# Patient Record
Sex: MALE | Race: WHITE | NOT HISPANIC OR LATINO | URBAN - METROPOLITAN AREA
[De-identification: names, ages, dates, MRNs, and addresses within clinical notes are randomized per-mention and may not be internally consistent; named-entity substitution may affect disease eponyms.]

---

## 2023-06-04 ENCOUNTER — EMERGENCY (EMERGENCY)
Facility: HOSPITAL | Age: 28
LOS: 1 days | Discharge: HOME | End: 2023-06-04
Attending: INTERNAL MEDICINE | Admitting: INTERNAL MEDICINE
Payer: COMMERCIAL

## 2023-06-04 VITALS
OXYGEN SATURATION: 99 % | HEART RATE: 90 BPM | WEIGHT: 164.91 LBS | RESPIRATION RATE: 18 BRPM | TEMPERATURE: 99 F | DIASTOLIC BLOOD PRESSURE: 75 MMHG | HEIGHT: 71 IN | SYSTOLIC BLOOD PRESSURE: 117 MMHG

## 2023-06-04 LAB
ALBUMIN SERPL ELPH-MCNC: 4.2 G/DL — SIGNIFICANT CHANGE UP (ref 3.3–5)
ALP SERPL-CCNC: 356 U/L — HIGH (ref 40–120)
ALT FLD-CCNC: 32 U/L — SIGNIFICANT CHANGE UP (ref 12–78)
AMPHET UR-MCNC: NEGATIVE — SIGNIFICANT CHANGE UP
AMYLASE P1 CFR SERPL: 64 U/L — SIGNIFICANT CHANGE UP (ref 25–125)
ANION GAP SERPL CALC-SCNC: 3 MMOL/L — LOW (ref 5–17)
APPEARANCE UR: CLEAR — SIGNIFICANT CHANGE UP
AST SERPL-CCNC: 24 U/L — SIGNIFICANT CHANGE UP (ref 15–37)
BARBITURATES UR SCN-MCNC: NEGATIVE — SIGNIFICANT CHANGE UP
BASOPHILS # BLD AUTO: 0.04 K/UL — SIGNIFICANT CHANGE UP (ref 0–0.2)
BASOPHILS NFR BLD AUTO: 0.4 % — SIGNIFICANT CHANGE UP (ref 0–2)
BENZODIAZ UR-MCNC: NEGATIVE — SIGNIFICANT CHANGE UP
BILIRUB SERPL-MCNC: 0.4 MG/DL — SIGNIFICANT CHANGE UP (ref 0.2–1.2)
BILIRUB UR-MCNC: NEGATIVE — SIGNIFICANT CHANGE UP
BUN SERPL-MCNC: 13 MG/DL — SIGNIFICANT CHANGE UP (ref 7–23)
CALCIUM SERPL-MCNC: 8.8 MG/DL — SIGNIFICANT CHANGE UP (ref 8.5–10.1)
CHLORIDE SERPL-SCNC: 105 MMOL/L — SIGNIFICANT CHANGE UP (ref 96–108)
CK MB CFR SERPL CALC: <1 NG/ML — SIGNIFICANT CHANGE UP (ref 0–3.6)
CO2 SERPL-SCNC: 30 MMOL/L — SIGNIFICANT CHANGE UP (ref 22–31)
COCAINE METAB.OTHER UR-MCNC: NEGATIVE — SIGNIFICANT CHANGE UP
COLOR SPEC: YELLOW — SIGNIFICANT CHANGE UP
CREAT SERPL-MCNC: 1.1 MG/DL — SIGNIFICANT CHANGE UP (ref 0.5–1.3)
DIFF PNL FLD: NEGATIVE — SIGNIFICANT CHANGE UP
EGFR: 94 ML/MIN/1.73M2 — SIGNIFICANT CHANGE UP
EOSINOPHIL # BLD AUTO: 0.94 K/UL — HIGH (ref 0–0.5)
EOSINOPHIL NFR BLD AUTO: 9.7 % — HIGH (ref 0–6)
GLUCOSE SERPL-MCNC: 110 MG/DL — HIGH (ref 70–99)
GLUCOSE UR QL: NEGATIVE MG/DL — SIGNIFICANT CHANGE UP
HCT VFR BLD CALC: 46.4 % — SIGNIFICANT CHANGE UP (ref 39–50)
HGB BLD-MCNC: 15.7 G/DL — SIGNIFICANT CHANGE UP (ref 13–17)
IMM GRANULOCYTES NFR BLD AUTO: 0.4 % — SIGNIFICANT CHANGE UP (ref 0–0.9)
KETONES UR-MCNC: NEGATIVE MG/DL — SIGNIFICANT CHANGE UP
LEUKOCYTE ESTERASE UR-ACNC: NEGATIVE — SIGNIFICANT CHANGE UP
LIDOCAIN IGE QN: 146 U/L — SIGNIFICANT CHANGE UP (ref 73–393)
LYMPHOCYTES # BLD AUTO: 2.97 K/UL — SIGNIFICANT CHANGE UP (ref 1–3.3)
LYMPHOCYTES # BLD AUTO: 30.7 % — SIGNIFICANT CHANGE UP (ref 13–44)
MCHC RBC-ENTMCNC: 31.5 PG — SIGNIFICANT CHANGE UP (ref 27–34)
MCHC RBC-ENTMCNC: 33.8 GM/DL — SIGNIFICANT CHANGE UP (ref 32–36)
MCV RBC AUTO: 93 FL — SIGNIFICANT CHANGE UP (ref 80–100)
METHADONE UR-MCNC: NEGATIVE — SIGNIFICANT CHANGE UP
MONOCYTES # BLD AUTO: 0.63 K/UL — SIGNIFICANT CHANGE UP (ref 0–0.9)
MONOCYTES NFR BLD AUTO: 6.5 % — SIGNIFICANT CHANGE UP (ref 2–14)
NEUTROPHILS # BLD AUTO: 5.06 K/UL — SIGNIFICANT CHANGE UP (ref 1.8–7.4)
NEUTROPHILS NFR BLD AUTO: 52.3 % — SIGNIFICANT CHANGE UP (ref 43–77)
NITRITE UR-MCNC: NEGATIVE — SIGNIFICANT CHANGE UP
NRBC # BLD: 0 /100 WBCS — SIGNIFICANT CHANGE UP (ref 0–0)
OPIATES UR-MCNC: NEGATIVE — SIGNIFICANT CHANGE UP
PCP SPEC-MCNC: SIGNIFICANT CHANGE UP
PCP UR-MCNC: NEGATIVE — SIGNIFICANT CHANGE UP
PH UR: 7.5 — SIGNIFICANT CHANGE UP (ref 5–8)
PLATELET # BLD AUTO: 189 K/UL — SIGNIFICANT CHANGE UP (ref 150–400)
POTASSIUM SERPL-MCNC: 4.1 MMOL/L — SIGNIFICANT CHANGE UP (ref 3.5–5.3)
POTASSIUM SERPL-SCNC: 4.1 MMOL/L — SIGNIFICANT CHANGE UP (ref 3.5–5.3)
PROT SERPL-MCNC: 7.1 G/DL — SIGNIFICANT CHANGE UP (ref 6–8.3)
PROT UR-MCNC: NEGATIVE MG/DL — SIGNIFICANT CHANGE UP
RBC # BLD: 4.99 M/UL — SIGNIFICANT CHANGE UP (ref 4.2–5.8)
RBC # FLD: 12.3 % — SIGNIFICANT CHANGE UP (ref 10.3–14.5)
SODIUM SERPL-SCNC: 138 MMOL/L — SIGNIFICANT CHANGE UP (ref 135–145)
SP GR SPEC: 1.02 — SIGNIFICANT CHANGE UP (ref 1–1.03)
THC UR QL: POSITIVE — SIGNIFICANT CHANGE UP
TROPONIN I, HIGH SENSITIVITY RESULT: 3.7 NG/L — SIGNIFICANT CHANGE UP
UROBILINOGEN FLD QL: 1 MG/DL — SIGNIFICANT CHANGE UP (ref 0.2–1)
WBC # BLD: 9.68 K/UL — SIGNIFICANT CHANGE UP (ref 3.8–10.5)
WBC # FLD AUTO: 9.68 K/UL — SIGNIFICANT CHANGE UP (ref 3.8–10.5)

## 2023-06-04 PROCEDURE — 93010 ELECTROCARDIOGRAM REPORT: CPT

## 2023-06-04 PROCEDURE — 84484 ASSAY OF TROPONIN QUANT: CPT

## 2023-06-04 PROCEDURE — 82150 ASSAY OF AMYLASE: CPT

## 2023-06-04 PROCEDURE — 83690 ASSAY OF LIPASE: CPT

## 2023-06-04 PROCEDURE — 71045 X-RAY EXAM CHEST 1 VIEW: CPT

## 2023-06-04 PROCEDURE — 71045 X-RAY EXAM CHEST 1 VIEW: CPT | Mod: 26

## 2023-06-04 PROCEDURE — 0225U NFCT DS DNA&RNA 21 SARSCOV2: CPT

## 2023-06-04 PROCEDURE — 76705 ECHO EXAM OF ABDOMEN: CPT

## 2023-06-04 PROCEDURE — 99284 EMERGENCY DEPT VISIT MOD MDM: CPT | Mod: 25

## 2023-06-04 PROCEDURE — 80053 COMPREHEN METABOLIC PANEL: CPT

## 2023-06-04 PROCEDURE — 80307 DRUG TEST PRSMV CHEM ANLYZR: CPT

## 2023-06-04 PROCEDURE — 70450 CT HEAD/BRAIN W/O DYE: CPT | Mod: MA

## 2023-06-04 PROCEDURE — 85025 COMPLETE CBC W/AUTO DIFF WBC: CPT

## 2023-06-04 PROCEDURE — 82553 CREATINE MB FRACTION: CPT

## 2023-06-04 PROCEDURE — 36415 COLL VENOUS BLD VENIPUNCTURE: CPT

## 2023-06-04 PROCEDURE — 93005 ELECTROCARDIOGRAM TRACING: CPT

## 2023-06-04 PROCEDURE — 81003 URINALYSIS AUTO W/O SCOPE: CPT

## 2023-06-04 PROCEDURE — 99284 EMERGENCY DEPT VISIT MOD MDM: CPT

## 2023-06-04 NOTE — ED ADULT NURSE NOTE - OBJECTIVE STATEMENT
patient comes in with C/o "I feel like my vision is weird and getting blurred on and off for the past 2 weeks. I also have chills and feel cold. I also have a bump under left ear it comes and goes. I also have loss of appetite." Denies seeking any medical attention, states "I don't have a PCP." dizziness, headache, fever at this time. Pt states "I smoke some weed and drink only on Monday's." Denies any other drug use or daily alcohol use.

## 2023-06-04 NOTE — ED PROVIDER NOTE - NSFOLLOWUPCLINICS_GEN_ALL_ED_FT
[facility] - [place]  [facility]  [address], [address]  [place] [address]  Phone: [phone]  Fax: [phone]

## 2023-06-04 NOTE — ED ADULT TRIAGE NOTE - CHIEF COMPLAINT QUOTE
26 y/o male received ambulatory to triage. Alert and oriented x4. C/o "I feel like my vision is weird and getting blurred on and off for the past 2 weeks. I also have chills and feel cold. I also have a bump under left ear it comes and goes. I also have loss of appetite." Pt with multiple complaints. Denies seeking any medical attention, states "I don't have a PCP." Pt denies any cp, sob, n/v/d, dizziness, headache, fever at this time. Respirations even and unlabored. Pt states "I smoke some weed and drink only on Monday's." Denies any other drug use or daily alcohol use.

## 2023-06-04 NOTE — ED ADULT NURSE NOTE - NS_SISCREENINGSR_GEN_ALL_ED
End of Shift Note: Surgical    Procedure:  1/29/2019 1 Day Post-Op  Right partial nephrectomy  Pain Management: Last Pain Score: 3/10  Medication Tylenol.   Last given:  0220  Diet: clear liquid  LBM: PTA  Dressing:  Incision clean, dry, and intact   Anticoagulant: Medication:  none  Activity:Stand by assist  Number of times ambulated: pt ambulating every 3 hours.   Significant Events: none  LDAs: peripheral IV  Discharge Plan: Anticipated discharge date:  1/30                     Disposition:   Home              Negative

## 2023-06-04 NOTE — ED ADULT NURSE NOTE - NSFALLUNIVINTERV_ED_ALL_ED
Bed/Stretcher in lowest position, wheels locked, appropriate side rails in place/Call bell, personal items and telephone in reach/Instruct patient to call for assistance before getting out of bed/chair/stretcher/Non-slip footwear applied when patient is off stretcher/[place] to call system/Physically safe environment - no spills, clutter or unnecessary equipment/Purposeful proactive rounding/Room/bathroom lighting operational, light cord in reach

## 2023-06-04 NOTE — ED PROVIDER NOTE - CARE PROVIDER_API CALL
Angel Clark  Gastroenterology  [address] [place] [address]  Phone: [phone]  Fax: [phone]  Follow Up Time: 1-3 Days

## 2023-06-04 NOTE — ED PROVIDER NOTE - OBJECTIVE STATEMENT
28 y/o male received ambulatory to triage. Alert and oriented x4. C/o "I feel like my vision is weird and getting blurred on and off for the past 2 weeks. I also have chills and feel cold. I also have a bump under left ear it comes and goes. I also have loss of appetite." Pt with multiple complaints. Denies seeking any medical attention, states "I don't have a PCP." Pt denies any cp, sob, n/v/d, dizziness, headache, fever at this time. Respirations even and unlabored. Pt states "I smoke some weed and drink only on Monday's." Denies any other drug use or daily alcohol use. 28 y/o male received ambulatory to triage. multiple complaints,  with focus of  left ear lobe folliculitis . Pt denies any cp, sob, n/v/d, dizziness, headache, fever at this time. Respirations even and unlabored. Pt states "I smoke THC and  occasional ETOH.

## 2023-06-04 NOTE — ED PROVIDER NOTE - CLINICAL SUMMARY MEDICAL DECISION MAKING FREE TEXT BOX
left ear folliculitis, incidental high alkaline phosphatase,  US BG sono was  normal, Rx doxycycline, derm and  GI referral

## 2023-06-04 NOTE — ED PROVIDER NOTE - CARE PLAN
1 Principal Discharge DX:	High alkaline phosphatase   Principal Discharge DX:	High alkaline phosphatase  Secondary Diagnosis:	Cellulitis   Principal Discharge DX:	High alkaline phosphatase  Secondary Diagnosis:	Folliculitis

## 2023-06-04 NOTE — ED ADULT NURSE NOTE - CHIEF COMPLAINT QUOTE
28 y/o male received ambulatory to triage. Alert and oriented x4. C/o "I feel like my vision is weird and getting blurred on and off for the past 2 weeks. I also have chills and feel cold. I also have a bump under left ear it comes and goes. I also have loss of appetite." Pt with multiple complaints. Denies seeking any medical attention, states "I don't have a PCP." Pt denies any cp, sob, n/v/d, dizziness, headache, fever at this time. Respirations even and unlabored. Pt states "I smoke some weed and drink only on Monday's." Denies any other drug use or daily alcohol use.

## 2023-06-04 NOTE — ED PROVIDER NOTE - NSFOLLOWUPINSTRUCTIONS_ED_ALL_ED_FT
A blood test called alkaline phosphatase was elevated,  this enzyme comes from the bone and or liver, since your  test was elevated,  it will have to be ingested further, Follow up with your doctor or the GI referral given A blood test called alkaline phosphatase was elevated,  this enzyme comes from the bone and or liver, since your  test was elevated,  it will have to be ingested further, Follow up with your doctor or the GI referral given  Doxycycline antibiotic sent to the pharmacy for the skin infection, take twice daily and F/U with the dermatologist

## 2023-06-04 NOTE — ED PROVIDER NOTE - PATIENT PORTAL LINK FT
You can access the FollowMyHealth Patient Portal offered by [facility] by registering at the following website: [email or web address]. By joining [facility]’s FollowMyHealth portal, you will also be able to view your health information using other applications (apps) compatible with our system.

## 2023-06-05 VITALS
TEMPERATURE: 98 F | RESPIRATION RATE: 16 BRPM | OXYGEN SATURATION: 99 % | SYSTOLIC BLOOD PRESSURE: 100 MMHG | HEART RATE: 76 BPM | DIASTOLIC BLOOD PRESSURE: 65 MMHG

## 2023-06-05 LAB
RAPID RVP RESULT: SIGNIFICANT CHANGE UP
SARS-COV-2 RNA SPEC QL NAA+PROBE: SIGNIFICANT CHANGE UP

## 2023-06-05 PROCEDURE — 76705 ECHO EXAM OF ABDOMEN: CPT | Mod: 26

## 2023-06-05 PROCEDURE — 70450 CT HEAD/BRAIN W/O DYE: CPT | Mod: 26,MA

## 2023-06-05 NOTE — ED ADULT NURSE REASSESSMENT NOTE - NS ED NURSE REASSESS COMMENT FT1
dc home to follow up with dermatology and gastro pt verbalizes understanding
pt evaluated and taken to ct scan and ultrasound awaiting radiology result  no c/o voiced at present

## 2023-06-09 ENCOUNTER — APPOINTMENT (OUTPATIENT)
Dept: INTERNAL MEDICINE | Facility: CLINIC | Age: 28
End: 2023-06-09

## 2023-06-16 ENCOUNTER — APPOINTMENT (OUTPATIENT)
Dept: DERMATOLOGY | Facility: CLINIC | Age: 28
End: 2023-06-16
Payer: COMMERCIAL

## 2023-06-16 ENCOUNTER — NON-APPOINTMENT (OUTPATIENT)
Age: 28
End: 2023-06-16

## 2023-06-16 PROCEDURE — 99203 OFFICE O/P NEW LOW 30 MIN: CPT

## 2023-06-16 NOTE — PHYSICAL EXAM
[Alert] : alert [Oriented x 3] : ~L oriented x 3 [Well Nourished] : well nourished [Conjunctiva Non-injected] : conjunctiva non-injected [Declined] : declined [FreeTextEntry3] : Focused exam only (see below) per patient request:\par \par irregular cystic nodule soft, ill-defined, no visible punctum, L posterior jawline under earlobe\par \par cystic papulonodule with punctum on R posterior shoulder

## 2023-06-16 NOTE — HISTORY OF PRESENT ILLNESS
[FreeTextEntry1] : bump [de-identified] : Mr. AMARA CHRIS is a 27 year old M here for evaluation of below\par \par Problem: bump\par Location: L posterior jawline (under L ear)\par Duration: few months\par Associated symptoms/Aggravating Factors: bump gets bigger and smaller; drains sometimes thick discharge\par +Pt having dizziness, nausea, cold sweats, numbness in hands. Denies weight loss. Also has CT C/A/P planned later today due to elevated ALK phos\par Modifying factors/Treatments: went to ER and given PO doxycycline (currently taking)\par \par #Spot on R shoulder, x10 years, sometimes inflamed and enlarges. Not painful\par No other changing or concerning lesions. \par No itchy, growing, bleeding, painful, or changing moles. \par \par Personal hx of skin cancer: no\par FHx of skin cancer: no\par Social Hx: \par \par

## 2023-06-16 NOTE — ASSESSMENT
[FreeTextEntry1] : Neoplasm of uncertain behavior x1, L posterior jawline (under earlobe)\par - Rule out cyst vs. malignancy\par - Referral to plastic surgery for excisional biopsy. Risks (scar, infection, bleeding, recurrence) and benefits of procedure discussed with patient; patient expressed understanding and wishes to proceed.\par Referral line # [phone] given to patient for scheduling assistance\par \par Epidermal inclusion cyst, R posterior shoulder\par - Clinically benign\par - No treatment necessary unless lesion becomes bothersome \par - If lesion is bothersome, it can be excised surgically\par

## 2023-06-19 ENCOUNTER — RESULT REVIEW (OUTPATIENT)
Age: 28
End: 2023-06-19

## 2023-06-19 ENCOUNTER — OUTPATIENT (OUTPATIENT)
Dept: OUTPATIENT SERVICES | Facility: HOSPITAL | Age: 28
LOS: 1 days | End: 2023-06-19
Payer: COMMERCIAL

## 2023-06-19 ENCOUNTER — APPOINTMENT (OUTPATIENT)
Dept: CT IMAGING | Facility: CLINIC | Age: 28
End: 2023-06-19
Payer: COMMERCIAL

## 2023-06-19 DIAGNOSIS — Z00.8 ENCOUNTER FOR OTHER GENERAL EXAMINATION: ICD-10-CM

## 2023-06-19 PROCEDURE — 74177 CT ABD & PELVIS W/CONTRAST: CPT

## 2023-06-19 PROCEDURE — 74177 CT ABD & PELVIS W/CONTRAST: CPT | Mod: 26

## 2023-06-27 ENCOUNTER — NON-APPOINTMENT (OUTPATIENT)
Age: 28
End: 2023-06-27

## 2023-06-27 ENCOUNTER — APPOINTMENT (OUTPATIENT)
Dept: INTERNAL MEDICINE | Facility: CLINIC | Age: 28
End: 2023-06-27
Payer: COMMERCIAL

## 2023-06-27 VITALS
HEIGHT: 71 IN | SYSTOLIC BLOOD PRESSURE: 138 MMHG | BODY MASS INDEX: 23.1 KG/M2 | DIASTOLIC BLOOD PRESSURE: 81 MMHG | WEIGHT: 165 LBS | HEART RATE: 91 BPM | OXYGEN SATURATION: 98 % | TEMPERATURE: 98.7 F

## 2023-06-27 VITALS — DIASTOLIC BLOOD PRESSURE: 70 MMHG | SYSTOLIC BLOOD PRESSURE: 122 MMHG

## 2023-06-27 DIAGNOSIS — R74.8 ABNORMAL LEVELS OF OTHER SERUM ENZYMES: ICD-10-CM

## 2023-06-27 DIAGNOSIS — Z83.3 FAMILY HISTORY OF DIABETES MELLITUS: ICD-10-CM

## 2023-06-27 DIAGNOSIS — Z82.3 FAMILY HISTORY OF STROKE: ICD-10-CM

## 2023-06-27 DIAGNOSIS — Z80.0 FAMILY HISTORY OF MALIGNANT NEOPLASM OF DIGESTIVE ORGANS: ICD-10-CM

## 2023-06-27 DIAGNOSIS — Z00.00 ENCOUNTER FOR GENERAL ADULT MEDICAL EXAMINATION W/OUT ABNORMAL FINDINGS: ICD-10-CM

## 2023-06-27 DIAGNOSIS — Z83.518 FAMILY HISTORY OF OTHER SPECIFIED EYE DISORDER: ICD-10-CM

## 2023-06-27 DIAGNOSIS — Z83.79 FAMILY HISTORY OF OTHER DISEASES OF THE DIGESTIVE SYSTEM: ICD-10-CM

## 2023-06-27 DIAGNOSIS — Z82.49 FAMILY HISTORY OF ISCHEMIC HEART DISEASE AND OTHER DISEASES OF THE CIRCULATORY SYSTEM: ICD-10-CM

## 2023-06-27 DIAGNOSIS — R42 DIZZINESS AND GIDDINESS: ICD-10-CM

## 2023-06-27 DIAGNOSIS — Z83.511 FAMILY HISTORY OF GLAUCOMA: ICD-10-CM

## 2023-06-27 PROCEDURE — 93000 ELECTROCARDIOGRAM COMPLETE: CPT

## 2023-06-27 PROCEDURE — 36415 COLL VENOUS BLD VENIPUNCTURE: CPT

## 2023-06-27 PROCEDURE — 99204 OFFICE O/P NEW MOD 45 MIN: CPT | Mod: 25

## 2023-06-27 RX ORDER — ALBUTEROL SULFATE 90 UG/1
108 (90 BASE) INHALANT RESPIRATORY (INHALATION)
Refills: 0 | Status: ACTIVE | COMMUNITY
Start: 2023-06-27

## 2023-06-27 RX ORDER — DOXYCYCLINE 100 MG/1
100 TABLET, FILM COATED ORAL
Refills: 0 | Status: DISCONTINUED | COMMUNITY
Start: 2023-06-27 | End: 2023-06-27

## 2023-06-29 PROBLEM — R74.8 ELEVATED ALKALINE PHOSPHATASE LEVEL: Status: ACTIVE | Noted: 2023-06-28

## 2023-06-29 NOTE — ASSESSMENT
[FreeTextEntry1] : AMARA CHRIS is a 27 year M with PMHx of asthma who presents s/p [facility] ED visit 6/4 for multiple complaints including dizziness, blurry vision, nausea, lack of appetite. Symptoms episodic, largely unchanged. \par ED: labs significant for elevated alk phos (356) and elevated eosinophils (9.7%, abs 0.94). RVP negative. CXR, CTH, RUQ US all WNL. Utox positive for THC.\par GI Dr. Clark: CT a/p w/ IV and PO contrast 6/19 showing small hypervascular focus along the falciform ligament in the L lobe equilibrate on venous phase suggesting a shunt and ?small hemangioma. Labs 6/23 significant for alk phos 146 (marked improvement from ED) and low vitamin D. ESR, CRP, hepatitis panel, mono screen, GGT, amylase, lipase, iron studies WNL.\par Ophthalmology (does not recall name): saw 1.5 weeks ago for blurry vision; reports he will be getting new glasses but exam was otherwise WNL\par -unclear etiology of symptoms, workup ongoing\par -remainder of routine labs and std testing ordered\par -EKG SR, early repolarization\par -given blurry vision and dizziness, will order MRI head and US carotid\par -patient to follow up with GI regarding results and next steps- to send me records\par \par L earlobe pain/mass\par -s/p course of doxycycline for presumed L ear folliculitis\par -followed with Sherry Galeas. Plan for biopsy 6/30 with Plastics

## 2023-06-29 NOTE — HISTORY OF PRESENT ILLNESS
[FreeTextEntry8] : AMARA CHRIS is a 27 year M who presents for ED follow up\par PMHx asthma \par Hasn't seen PCP in 4-5 years \par Seen at [facility] ED 6/4 for dizziness, blurry vision, nausea, lack of appetite, L earlobe pain\par Symptoms started end of May; doesn't recall any inciting events\par Labs significant for elevated alk phos (356) and elevated eosinophils (9.7%, abs 0.94)\par S/p CXR, CTH, RUQ US- all WNL\par Patient DC'ed on doxycycline for presumed L ear folliculitis; and advised to see derm and GI\par Reports he continues to have symptoms intermittently. No palliating or exacerbating factors. \par Denies fever, chills, weight loss, abdominal pain, vomiting, diarrhea, headaches, numbness/tingling, weakness. \par \par GI Dr. Clark, s/p CT a/p 6/19 and labs 6/23. Reports he is planned for follow up once all results are back. \par Ophtho (does not recall name)- saw 1.5 weeks ago; he will be getting new glasses.\par Derm Dr. Galeas- L ear mass- plan for biopsy 6/30 with Plastics \par \par Social History:\par Reports THC once a year, last was 2.5 months ago\par States from [id number], smoked everyday \par Smokes 3-4 cigarettes a day\par Etoh use 1x/week, but stopped since May as he was feeling unwell\par Works as

## 2023-06-29 NOTE — HISTORY OF PRESENT ILLNESS
[FreeTextEntry8] : AMARA CHRIS is a 27 year M who presents for ED follow up\par PMHx asthma \par Hasn't seen PCP in 4-5 years \par Seen at \[facility]\"" ED 6/4 for dizziness, blurry vision, nausea, lack of appetite, L earlobe pain\par Symptoms started end of May; doesn't recall any inciting events\par Labs significant for elevated alk phos (356) and elevated eosinophils (9.7%, abs 0.94)\par S/p CXR, CTH, RUQ US- all WNL\par Patient DC'ed on doxycycline for presumed L ear folliculitis; and advised to see derm and GI\par Reports he continues to have symptoms intermittently. No palliating or exacerbating factors. \par Denies fever, chills, weight loss, abdominal pain, vomiting, diarrhea, headaches, numbness/tingling, weakness. \par \par GI Dr. Clark, s/p CT a/p 6/19 and labs 6/23. Reports he is planned for follow up once all results are back. \par Ophtho (does not recall name)- saw 1.5 weeks ago; he will be getting new glasses.\par Derm Dr. Galeas- L ear mass- plan for biopsy 6/30 with Plastics \par \par Social History:\par Reports THC once a year, last was 2.5 months ago\par States from 8933-7113, smoked everyday \par Smokes 3-4 cigarettes a day\par Etoh use 1x/week, but stopped since May as he was feeling unwell\par Works as

## 2023-06-29 NOTE — PHYSICAL EXAM
[No Acute Distress] : no acute distress [Well-Appearing] : well-appearing [Normal Sclera/Conjunctiva] : normal sclera/conjunctiva [EOMI] : extraocular movements intact [No Respiratory Distress] : no respiratory distress  [No Accessory Muscle Use] : no accessory muscle use [Clear to Auscultation] : lungs were clear to auscultation bilaterally [Normal Rate] : normal rate  [Regular Rhythm] : with a regular rhythm [Normal S1, S2] : normal S1 and S2 [No Edema] : there was no peripheral edema [No Extremity Clubbing/Cyanosis] : no extremity clubbing/cyanosis [Soft] : abdomen soft [Non Tender] : non-tender [Non-distended] : non-distended [Normal Bowel Sounds] : normal bowel sounds [Coordination Grossly Intact] : coordination grossly intact [No Focal Deficits] : no focal deficits [Normal Gait] : normal gait [Normal Affect] : the affect was normal [Normal Insight/Judgement] : insight and judgment were intact [de-identified] : Palpable lump inferior to L ear; non-TTP, no erythema or drainage noted

## 2023-06-29 NOTE — PHYSICAL EXAM
[No Acute Distress] : no acute distress [Well-Appearing] : well-appearing [Normal Sclera/Conjunctiva] : normal sclera/conjunctiva [EOMI] : extraocular movements intact [No Respiratory Distress] : no respiratory distress  [No Accessory Muscle Use] : no accessory muscle use [Clear to Auscultation] : lungs were clear to auscultation bilaterally [Normal Rate] : normal rate  [Regular Rhythm] : with a regular rhythm [Normal S1, S2] : normal S1 and S2 [No Edema] : there was no peripheral edema [No Extremity Clubbing/Cyanosis] : no extremity clubbing/cyanosis [Soft] : abdomen soft [Non Tender] : non-tender [Non-distended] : non-distended [Normal Bowel Sounds] : normal bowel sounds [Coordination Grossly Intact] : coordination grossly intact [No Focal Deficits] : no focal deficits [Normal Gait] : normal gait [Normal Affect] : the affect was normal [Normal Insight/Judgement] : insight and judgment were intact [de-identified] : Palpable lump inferior to L ear; non-TTP, no erythema or drainage noted

## 2023-06-30 ENCOUNTER — APPOINTMENT (OUTPATIENT)
Dept: PLASTIC SURGERY | Facility: CLINIC | Age: 28
End: 2023-06-30
Payer: COMMERCIAL

## 2023-06-30 VITALS — HEIGHT: 71 IN | BODY MASS INDEX: 23.1 KG/M2 | WEIGHT: 165 LBS

## 2023-06-30 DIAGNOSIS — L72.3 SEBACEOUS CYST: ICD-10-CM

## 2023-06-30 PROCEDURE — 99203 OFFICE O/P NEW LOW 30 MIN: CPT

## 2023-07-03 PROBLEM — L72.3 SEBACEOUS CYST OF EAR: Status: ACTIVE | Noted: 2023-07-03

## 2023-07-03 NOTE — HISTORY OF PRESENT ILLNESS
[FreeTextEntry1] : 27 years old patient presents in the office for \par Problem: mass \par Location:  under earlobe on left side\par Duration:  about 3 months\par Seen by Dermatology:  Dr Galeas\par Treated with oral Doxycycline, no improvement\par Asymptomatic \par No imaging/Biopsy\par Increasing size\par No Infection or inflammation\par Family history of cancer \par pt reports h/o malaise, dizziness and loss of balance and that he has been told that mass could be contributing to symptoms

## 2023-07-05 LAB
ALP BLD-CCNC: 131 IU/L
ALP BONE CFR SERPL: 52 %
ALP INTEST CFR SERPL: 5 %
ALP LIVER CFR SERPL: 43 %
CHOLEST SERPL-MCNC: 174 MG/DL
ESTIMATED AVERAGE GLUCOSE: 120 MG/DL
HBA1C MFR BLD HPLC: 5.8 %
HDLC SERPL-MCNC: 41 MG/DL
HIV1+2 AB SPEC QL IA.RAPID: NONREACTIVE
LDLC SERPL CALC-MCNC: 110 MG/DL
NONHDLC SERPL-MCNC: 132 MG/DL
T PALLIDUM AB SER QL IA: NEGATIVE
TRIGL SERPL-MCNC: 112 MG/DL

## 2023-07-25 ENCOUNTER — APPOINTMENT (OUTPATIENT)
Dept: PLASTIC SURGERY | Facility: CLINIC | Age: 28
End: 2023-07-25
Payer: COMMERCIAL

## 2023-07-25 ENCOUNTER — LABORATORY RESULT (OUTPATIENT)
Age: 28
End: 2023-07-25

## 2023-07-25 PROCEDURE — 21555 EXC NECK LES SC < 3 CM: CPT

## 2023-07-25 PROCEDURE — 13131 CMPLX RPR F/C/C/M/N/AX/G/H/F: CPT | Mod: 58,59

## 2023-08-02 ENCOUNTER — APPOINTMENT (OUTPATIENT)
Dept: ULTRASOUND IMAGING | Facility: CLINIC | Age: 28
End: 2023-08-02

## 2023-08-02 ENCOUNTER — APPOINTMENT (OUTPATIENT)
Dept: MRI IMAGING | Facility: CLINIC | Age: 28
End: 2023-08-02

## 2023-08-03 ENCOUNTER — APPOINTMENT (OUTPATIENT)
Dept: PLASTIC SURGERY | Facility: CLINIC | Age: 28
End: 2023-08-03
Payer: COMMERCIAL

## 2023-08-03 PROCEDURE — 99024 POSTOP FOLLOW-UP VISIT: CPT

## 2023-08-08 NOTE — PROCEDURE
[FreeTextEntry6] : Preopdx: posterior neck  cyst  Procedure: excisional biopsy  1.1 cm, and complex closure 1.1 cm neck Anesthesia: local 1% w/epi Specimens: to path on formalin No complications  Summary: IC obtained.  Lesion demarcated with marking pen.  1%lido with epinephrine injected.  15 blade used to incise full thickness.    1.1Cm  lesion excised in subcutaneous plane.   Hemostasis obtained with cautery.  Skin edges widely undermined and closed for a complex closure of  1.1 cm.  bacitracin and steristrips placed.

## 2023-08-10 ENCOUNTER — APPOINTMENT (OUTPATIENT)
Dept: PLASTIC SURGERY | Facility: CLINIC | Age: 28
End: 2023-08-10
Payer: COMMERCIAL

## 2023-08-10 ENCOUNTER — LABORATORY RESULT (OUTPATIENT)
Age: 28
End: 2023-08-10

## 2023-08-10 DIAGNOSIS — D48.9 NEOPLASM OF UNCERTAIN BEHAVIOR, UNSPECIFIED: ICD-10-CM

## 2023-08-10 PROCEDURE — 21555 EXC NECK LES SC < 3 CM: CPT | Mod: 58

## 2023-08-10 PROCEDURE — 13131 CMPLX RPR F/C/C/M/N/AX/G/H/F: CPT | Mod: 58

## 2023-08-10 NOTE — PROCEDURE
[FreeTextEntry6] : Preopdx: left post auricular   cyst  Procedure: excisional biopsy  1.1 cm, and complex closure 1.1 cm neck Anesthesia: local 1% w/epi Specimens: to path on formalin No complications  Summary: IC obtained.  Lesion demarcated with marking pen.  1%lido with epinephrine injected.  15 blade used to incise full thickness.    1.1Cm  lesion excised in subcutaneous plane.   Hemostasis obtained with cautery.  Skin edges widely undermined and closed for a complex closure of  1.1 cm.  bacitracin and steristrips placed.

## 2023-08-17 ENCOUNTER — APPOINTMENT (OUTPATIENT)
Dept: PLASTIC SURGERY | Facility: CLINIC | Age: 28
End: 2023-08-17
Payer: COMMERCIAL

## 2023-08-17 DIAGNOSIS — L72.0 EPIDERMAL CYST: ICD-10-CM

## 2023-08-17 PROCEDURE — 99024 POSTOP FOLLOW-UP VISIT: CPT

## 2023-08-17 NOTE — HISTORY OF PRESENT ILLNESS
[FreeTextEntry1] : Dop: 8/10/23 S/P: excisional biopsy of neck cyst.  Path: pending  Pt reports no complaints. No excessive bleeding. No fevers. No odor. No purulent discharge. No excessive pain.

## 2023-08-23 ENCOUNTER — APPOINTMENT (OUTPATIENT)
Dept: INTERNAL MEDICINE | Facility: CLINIC | Age: 28
End: 2023-08-23
Payer: COMMERCIAL

## 2023-08-23 VITALS
TEMPERATURE: 98.3 F | DIASTOLIC BLOOD PRESSURE: 72 MMHG | SYSTOLIC BLOOD PRESSURE: 111 MMHG | WEIGHT: 170 LBS | HEART RATE: 85 BPM | BODY MASS INDEX: 19.67 KG/M2 | OXYGEN SATURATION: 98 % | HEIGHT: 78 IN

## 2023-08-23 DIAGNOSIS — H53.8 OTHER VISUAL DISTURBANCES: ICD-10-CM

## 2023-08-23 DIAGNOSIS — R51.9 HEADACHE, UNSPECIFIED: ICD-10-CM

## 2023-08-23 PROCEDURE — 36415 COLL VENOUS BLD VENIPUNCTURE: CPT

## 2023-08-23 PROCEDURE — 99395 PREV VISIT EST AGE 18-39: CPT | Mod: 25

## 2023-08-26 NOTE — HISTORY OF PRESENT ILLNESS
[FreeTextEntry1] : cpe [de-identified] : AMARA CHRIS is a 27 year M who presents for CPE PMHx asthma Still having intermittent headaches, dizziness, blurry vision. Reports daily headaches, usually R sided, sharp, last approx 1 hour before improving. No associated photosensitivity.  States dizziness feels like being drunk; denies sensation of room or self spinning.  Nausea and lack of appetite better but not fully resolved; Has gained 5 lbs in last 1.5 months  Saw ophthalmologist, got new glasses with no change in symptoms. He was advised his eyes were otherwise good.

## 2023-08-26 NOTE — ASSESSMENT
[FreeTextEntry1] : HCM -well visit -recent labs reviewed -depression screen negative -flu vaccine- recommend fall 2023 -covid vaccines- none  -tdap- unsure when last received, advised to check records and obtain if 10+ years since last dose -advised to get annual eye, dental, and skin exams  Headaches Blurry vision Dizziness  -patient will get MRI head and US carotid as previously ordered -he was set up with neuro Dr. Schoenberg 9/18 -reports seen by ophthalmologist and got new glasses with no improvement in symptoms. He was advised his eyes were otherwise good  Elevated eosinophils -hx of asthma, no recent exacerbations/hospitalizations. No recent albuterol use -repeat cbc with diff

## 2023-08-26 NOTE — REVIEW OF SYSTEMS
[Negative] : Heme/Lymph [Vision Problems] : vision problems [FreeTextEntry7] : per hpi [de-identified] : per hpi

## 2023-08-26 NOTE — HEALTH RISK ASSESSMENT
[Yes] : Yes [Monthly or less (1 pt)] : Monthly or less (1 point) [1 or 2 (0 pts)] : 1 or 2 (0 points) [Never (0 pts)] : Never (0 points) [No] : In the past 12 months have you used drugs other than those required for medical reasons? No [No falls in past year] : Patient reported no falls in the past year [0] : 2) Feeling down, depressed, or hopeless: Not at all (0) [PHQ-2 Negative - No further assessment needed] : PHQ-2 Negative - No further assessment needed [Employed] : employed [Single] : single [Feels Safe at Home] : Feels safe at home [Fully functional (bathing, dressing, toileting, transferring, walking, feeding)] : Fully functional (bathing, dressing, toileting, transferring, walking, feeding) [Fully functional (using the telephone, shopping, preparing meals, housekeeping, doing laundry, using] : Fully functional and needs no help or supervision to perform IADLs (using the telephone, shopping, preparing meals, housekeeping, doing laundry, using transportation, managing medications and managing finances) [Current] : Current [Audit-CScore] : 1 [[id number]] : 0 [Sexually Active] : not sexually active [de-identified] : grandma, mom, dad, brother  [FreeTextEntry2] :   [de-identified] : 3-4x cigarettes a day

## 2023-08-26 NOTE — PHYSICAL EXAM
[No Acute Distress] : no acute distress [Well-Appearing] : well-appearing [Normal Sclera/Conjunctiva] : normal sclera/conjunctiva [EOMI] : extraocular movements intact [No Respiratory Distress] : no respiratory distress  [No Accessory Muscle Use] : no accessory muscle use [Clear to Auscultation] : lungs were clear to auscultation bilaterally [Regular Rhythm] : with a regular rhythm [Normal S1, S2] : normal S1 and S2 [No Edema] : there was no peripheral edema [No Extremity Clubbing/Cyanosis] : no extremity clubbing/cyanosis [Soft] : abdomen soft [Non Tender] : non-tender [Non-distended] : non-distended [Normal Bowel Sounds] : normal bowel sounds [Coordination Grossly Intact] : coordination grossly intact [No Focal Deficits] : no focal deficits [Normal Affect] : the affect was normal [Normal Insight/Judgement] : insight and judgment were intact [de-identified] : CN II-XII grossly intact. 5/5 strength in all 4 extremities

## 2023-08-29 ENCOUNTER — APPOINTMENT (OUTPATIENT)
Dept: ULTRASOUND IMAGING | Facility: CLINIC | Age: 28
End: 2023-08-29
Payer: COMMERCIAL

## 2023-08-29 ENCOUNTER — OUTPATIENT (OUTPATIENT)
Dept: OUTPATIENT SERVICES | Facility: HOSPITAL | Age: 28
LOS: 1 days | End: 2023-08-29
Payer: COMMERCIAL

## 2023-08-29 DIAGNOSIS — H53.8 OTHER VISUAL DISTURBANCES: ICD-10-CM

## 2023-08-29 PROCEDURE — 93880 EXTRACRANIAL BILAT STUDY: CPT | Mod: 26

## 2023-08-29 PROCEDURE — 93880 EXTRACRANIAL BILAT STUDY: CPT

## 2024-02-07 ENCOUNTER — NON-APPOINTMENT (OUTPATIENT)
Age: 29
End: 2024-02-07

## 2024-02-07 ENCOUNTER — APPOINTMENT (OUTPATIENT)
Dept: ALLERGY | Facility: CLINIC | Age: 29
End: 2024-02-07
Payer: COMMERCIAL

## 2024-02-07 DIAGNOSIS — F17.200 NICOTINE DEPENDENCE, UNSPECIFIED, UNCOMPLICATED: ICD-10-CM

## 2024-02-07 DIAGNOSIS — D72.10 EOSINOPHILIA, UNSPECIFIED: ICD-10-CM

## 2024-02-07 PROCEDURE — 99204 OFFICE O/P NEW MOD 45 MIN: CPT | Mod: 25

## 2024-02-07 PROCEDURE — 95004 PERQ TESTS W/ALRGNC XTRCS: CPT

## 2024-02-07 NOTE — REVIEW OF SYSTEMS
[Nl] : Genitourinary [FreeTextEntry8] : dizziness, memory loss - he saw neurologist in the past - biopsy by his ear - normal - follicular cyst

## 2024-02-07 NOTE — SOCIAL HISTORY
[House] : [unfilled] lives in a house  [Dog] : dog [Single] : single [Smokers in Household] : there are smokers in the home [FreeTextEntry1] :  Lives with GM, parents ,brother [de-identified] : x3

## 2024-02-07 NOTE — PHYSICAL EXAM
[Alert] : alert [Well Nourished] : well nourished [Healthy Appearance] : healthy appearance [No Acute Distress] : no acute distress [Well Developed] : well developed [Normal Voice/Communication] : normal voice communication [Normal Lips/Tongue] : the lips and tongue were normal [Normal Tonsils] : normal tonsils [Normal Rate] : heart rate was normal  [Normal S1, S2] : normal S1 and S2 [Regular Rhythm] : with a regular rhythm [Soft] : abdomen soft [Not Distended] : not distended [No HSM] : no hepato-splenomegaly [Normal Cervical Lymph Nodes] : cervical [Normal Axillary Lumph Nodes] : axillary [Normal Inguinal Lymph Nodes] : inguinal [Skin Intact] : skin intact  [No Cyanosis] : no cyanosis [Normal Mood] : mood was normal [Normal Affect] : affect was normal [Judgment and Insight Age Appropriate] : judgement and insight is age appropriate [Wheezing] : no wheezing was heard [de-identified] : slight fullness below left ear

## 2024-02-07 NOTE — IMPRESSION
[_____] : grasses ([unfilled]) [Allergy Testing Mixed Feathers] : feathers [Allergy Testing Dog] : dog [] : molds [Allergy Testing Trees] : trees [Allergy Testing Weeds] : weeds

## 2024-02-07 NOTE — HISTORY OF PRESENT ILLNESS
[de-identified] : Patient referred for elevated eosinophil count - only available labs are from 2023 with varying total eosinophil count of 1,010 and 1,150.   Patient takes no prescription medications and he takes Aleve or Advil x 3 days - Vitamin D since blood work was performed on daily basis  OTC medications.    His allergy history reveals a history of spring and fall asthma - no nasal or ocular symptoms - albuterol inhaler or nebulizer prn use.   No history of eczema.   He was not in the .   He reports travel in 2001 - living in [place] x 1-2 years - moved back to [place] because of dizziness.  Denies any associated joint pain, rashes - family history of autoimmune disorders - brother with RA.  No recent CXR.   He was seen in the ER for dizziness - nausea - lack of appetite - some double vision - some memory concerns - he will get lost coming home from work - CT of brain - advised normal.   He has had COVID 4x while living in [place].   He has not had COVID since returning to [place]

## 2024-02-07 NOTE — ASSESSMENT
[FreeTextEntry1] : Mild eosinophilia - no previous blood work prior to patient seeing Dr. Hays for comparison of absolute eosinophil level.  No foreign travel therefore parasitic etiology would be unlikely.   Patient with dizziness and memory loss of unclear etiology.   Repeat CBC with differential to see if any change since last testing  RA, ESR, CRP, RON. B12, tryptase  Stool O/P - Strongyloides IgG  Neurology work up recommended - patient given [facility] physician access for referral  If work up is negative than his elevated eosinophil count is most likely secondary to his underlying allergic disease   Mild intermittent asthma:  Continue albuterol 2 puffs QID prn

## 2024-02-13 LAB
ANA SER IF-ACNC: NEGATIVE
BASOPHILS # BLD AUTO: 0.04 K/UL
BASOPHILS NFR BLD AUTO: 0.4 %
CRP SERPL-MCNC: <3 MG/L
EOSINOPHIL # BLD AUTO: 0.86 K/UL
EOSINOPHIL NFR BLD AUTO: 8.5 %
ERYTHROCYTE [SEDIMENTATION RATE] IN BLOOD BY WESTERGREN METHOD: 8 MM/HR
HCT VFR BLD CALC: 46.6 %
HGB BLD-MCNC: 16 G/DL
IMM GRANULOCYTES NFR BLD AUTO: 0.3 %
LYMPHOCYTES # BLD AUTO: 2.39 K/UL
LYMPHOCYTES NFR BLD AUTO: 23.7 %
MAN DIFF?: NORMAL
MCHC RBC-ENTMCNC: 31.3 PG
MCHC RBC-ENTMCNC: 34.3 GM/DL
MCV RBC AUTO: 91.2 FL
MONOCYTES # BLD AUTO: 0.74 K/UL
MONOCYTES NFR BLD AUTO: 7.3 %
NEUTROPHILS # BLD AUTO: 6.04 K/UL
NEUTROPHILS NFR BLD AUTO: 59.8 %
PLATELET # BLD AUTO: 161 K/UL
RBC # BLD: 5.11 M/UL
RBC # FLD: 12.1 %
RHEUMATOID FACT SER QL: <10 IU/ML
STRONGYLOIDES AB SER IA-ACNC: NEGATIVE
TRYPTASE: 6.6 UG/L
VIT B12 SERPL-MCNC: 442 PG/ML
WBC # FLD AUTO: 10.1 K/UL

## 2025-06-04 ENCOUNTER — APPOINTMENT (OUTPATIENT)
Dept: ORTHOPEDIC SURGERY | Facility: CLINIC | Age: 30
End: 2025-06-04
Payer: COMMERCIAL

## 2025-06-04 DIAGNOSIS — M76.821 POSTERIOR TIBIAL TENDINITIS, RIGHT LEG: ICD-10-CM

## 2025-06-04 PROCEDURE — 99204 OFFICE O/P NEW MOD 45 MIN: CPT

## 2025-06-04 RX ORDER — MELOXICAM 15 MG/1
15 TABLET ORAL DAILY
Qty: 30 | Refills: 1 | Status: ACTIVE | COMMUNITY
Start: 2025-06-04 | End: 2025-08-03

## 2025-07-09 ENCOUNTER — APPOINTMENT (OUTPATIENT)
Dept: ORTHOPEDIC SURGERY | Facility: CLINIC | Age: 30
End: 2025-07-09
Payer: COMMERCIAL

## 2025-07-09 PROCEDURE — 99213 OFFICE O/P EST LOW 20 MIN: CPT

## 2025-07-09 RX ORDER — CELECOXIB 200 MG/1
200 CAPSULE ORAL DAILY
Qty: 30 | Refills: 1 | Status: ACTIVE | COMMUNITY
Start: 2025-07-09 | End: 1900-01-01

## 2025-08-06 ENCOUNTER — APPOINTMENT (OUTPATIENT)
Dept: ORTHOPEDIC SURGERY | Facility: CLINIC | Age: 30
End: 2025-08-06
Payer: COMMERCIAL

## 2025-08-06 DIAGNOSIS — M76.821 POSTERIOR TIBIAL TENDINITIS, RIGHT LEG: ICD-10-CM

## 2025-08-06 PROCEDURE — 99213 OFFICE O/P EST LOW 20 MIN: CPT

## 2025-08-06 RX ORDER — CELECOXIB 200 MG/1
200 CAPSULE ORAL DAILY
Qty: 30 | Refills: 1 | Status: ACTIVE | COMMUNITY
Start: 2025-08-06 | End: 1900-01-01

## 2025-09-10 ENCOUNTER — APPOINTMENT (OUTPATIENT)
Dept: ORTHOPEDIC SURGERY | Facility: CLINIC | Age: 30
End: 2025-09-10
Payer: COMMERCIAL

## 2025-09-10 DIAGNOSIS — M76.821 POSTERIOR TIBIAL TENDINITIS, RIGHT LEG: ICD-10-CM

## 2025-09-10 PROCEDURE — 99214 OFFICE O/P EST MOD 30 MIN: CPT

## 2025-09-16 ENCOUNTER — APPOINTMENT (OUTPATIENT)
Dept: MRI IMAGING | Facility: CLINIC | Age: 30
End: 2025-09-16
Payer: COMMERCIAL

## 2025-09-16 PROCEDURE — 73721 MRI JNT OF LWR EXTRE W/O DYE: CPT | Mod: RT
